# Patient Record
Sex: MALE | Race: WHITE | NOT HISPANIC OR LATINO
[De-identification: names, ages, dates, MRNs, and addresses within clinical notes are randomized per-mention and may not be internally consistent; named-entity substitution may affect disease eponyms.]

---

## 2018-11-01 ENCOUNTER — APPOINTMENT (OUTPATIENT)
Dept: HEMATOLOGY ONCOLOGY | Facility: CLINIC | Age: 48
End: 2018-11-01
Payer: COMMERCIAL

## 2018-11-01 ENCOUNTER — OUTPATIENT (OUTPATIENT)
Dept: OUTPATIENT SERVICES | Facility: HOSPITAL | Age: 48
LOS: 1 days | Discharge: HOME | End: 2018-11-01

## 2018-11-01 VITALS
SYSTOLIC BLOOD PRESSURE: 119 MMHG | OXYGEN SATURATION: 97 % | DIASTOLIC BLOOD PRESSURE: 78 MMHG | HEIGHT: 67.36 IN | BODY MASS INDEX: 28.56 KG/M2 | HEART RATE: 63 BPM | RESPIRATION RATE: 16 BRPM | WEIGHT: 184.08 LBS | TEMPERATURE: 98.3 F

## 2018-11-01 DIAGNOSIS — E78.5 HYPERLIPIDEMIA, UNSPECIFIED: ICD-10-CM

## 2018-11-01 DIAGNOSIS — Z86.79 PERSONAL HISTORY OF OTHER DISEASES OF THE CIRCULATORY SYSTEM: ICD-10-CM

## 2018-11-01 DIAGNOSIS — C61 MALIGNANT NEOPLASM OF PROSTATE: ICD-10-CM

## 2018-11-01 DIAGNOSIS — Z80.9 FAMILY HISTORY OF MALIGNANT NEOPLASM, UNSPECIFIED: ICD-10-CM

## 2018-11-01 DIAGNOSIS — Z87.891 PERSONAL HISTORY OF NICOTINE DEPENDENCE: ICD-10-CM

## 2018-11-01 PROCEDURE — 99205 OFFICE O/P NEW HI 60 MIN: CPT

## 2018-11-01 RX ORDER — LEVOFLOXACIN 500 MG/1
500 TABLET, FILM COATED ORAL
Qty: 5 | Refills: 0 | Status: DISCONTINUED | COMMUNITY
Start: 2018-07-09

## 2018-11-01 RX ORDER — NIACIN 1000 MG/1
1000 TABLET, EXTENDED RELEASE ORAL
Qty: 30 | Refills: 0 | Status: ACTIVE | COMMUNITY
Start: 2018-03-26

## 2018-11-01 RX ORDER — CEFUROXIME AXETIL 500 MG/1
500 TABLET ORAL
Qty: 10 | Refills: 0 | Status: DISCONTINUED | COMMUNITY
Start: 2018-07-09

## 2018-11-01 RX ORDER — NIACIN 500 MG/1
500 TABLET, EXTENDED RELEASE ORAL
Qty: 30 | Refills: 0 | Status: DISCONTINUED | COMMUNITY
Start: 2018-01-30

## 2018-11-01 RX ORDER — AMOXICILLIN AND CLAVULANATE POTASSIUM 875; 125 MG/1; MG/1
875-125 TABLET, COATED ORAL
Qty: 20 | Refills: 0 | Status: DISCONTINUED | COMMUNITY
Start: 2018-07-18

## 2018-11-01 RX ORDER — PANTOPRAZOLE 40 MG/1
40 TABLET, DELAYED RELEASE ORAL
Qty: 30 | Refills: 0 | Status: DISCONTINUED | COMMUNITY
Start: 2018-06-12

## 2018-11-01 NOTE — ASSESSMENT
[FreeTextEntry1] : Boris Mackey is a 48 years old male with mildly rising PSA and his brother diagnosed prostate cancer, was referred to Dr. Cline. TRUS biopsy showed stage I, pT1C mike 3+3 in 5/12 cores, percentage involvement from 4% to 85%. PSA prior to biopsy was 3.2ng/ml in July 2018. He has low risk of prostate cancer. \par \par I had a lengthy discussion with the patient regarding his stage I prostate cancer and further management. The treatment options are active surveillance, surgery or radiation. Given that he has 5 cores positive and the highest percentage involvement 85% plus his life expectancy more than 20 years active surveillance is not appropriate. I also  discussed about radical prostatectomy and its potential AEs including but not limited to incontinence and erectile dysfunction. Only radiation without ADT is appropriate for low risk of prostate cancer. Again he has an appointment with Rad Onc at [facility] to discuss with the potential benefits and AEs from Radiation Oncology. I will refer him to Dr. Mccray, Urologist at [facility] for discussing potential surgery.

## 2018-11-01 NOTE — PHYSICAL EXAM
[Fully active, able to carry on all pre-disease performance without restriction] : Status 0 - Fully active, able to carry on all pre-disease performance without restriction [Normal] : affect appropriate [FreeTextEntry1] : BONI: prostate smooth, nontender

## 2018-11-01 NOTE — REASON FOR VISIT
[Initial Consultation] : an initial consultation [Spouse] : spouse [FreeTextEntry2] : prostate adenocarcinoma

## 2018-11-01 NOTE — HISTORY OF PRESENT ILLNESS
[T: ___] : T[unfilled] [N: ___] : N[unfilled] [M: ___] : M[unfilled] [AJCC Stage: ____] : AJCC Stage: [unfilled] [de-identified] : Patric Mackey is a 48 years old male with gradually rising PSA, 3/2016  2.8, 5/2017  3.69,  6/2018  4.21,   07/2018  3.2. He underwent TURS prostate biopsy with Dr. Cline at [facility] . Pathology showed prostate adenocarcinoma, 5/12 core positive at left base, left lateral base, left apex, right lateral apex, right apex, mike 3+3 in all positive 5 cores, percentage involvement from 4% to 85%.  \par \par Urine flow is normal. He does not have urine frequency, burning, gross hematuria. Nocturia 1. \par \par His brother at 60 years old was recently diagnosed prostate cancer on radiation therapy.  [de-identified] : prostate adenocarcinoma

## 2018-11-01 NOTE — CONSULT LETTER
[Dear  ___] : Dear  [unfilled], [Consult Letter:] : I had the pleasure of evaluating your patient, [unfilled]. [Please see my note below.] : Please see my note below. [Consult Closing:] : Thank you very much for allowing me to participate in the care of this patient.  If you have any questions, please do not hesitate to contact me. [Sincerely,] : Sincerely, [FreeTextEntry3] : Kale Montes MD

## 2018-12-06 ENCOUNTER — OUTPATIENT (OUTPATIENT)
Dept: OUTPATIENT SERVICES | Facility: HOSPITAL | Age: 48
LOS: 1 days | Discharge: HOME | End: 2018-12-06
Payer: COMMERCIAL

## 2018-12-06 DIAGNOSIS — C61 MALIGNANT NEOPLASM OF PROSTATE: ICD-10-CM

## 2018-12-07 ENCOUNTER — RESULT REVIEW (OUTPATIENT)
Age: 48
End: 2018-12-07

## 2018-12-07 PROCEDURE — 88321 CONSLTJ&REPRT SLD PREP ELSWR: CPT
